# Patient Record
Sex: MALE | Race: BLACK OR AFRICAN AMERICAN
[De-identification: names, ages, dates, MRNs, and addresses within clinical notes are randomized per-mention and may not be internally consistent; named-entity substitution may affect disease eponyms.]

---

## 2019-10-13 ENCOUNTER — HOSPITAL ENCOUNTER (EMERGENCY)
Dept: HOSPITAL 72 - EMR | Age: 64
Discharge: HOME | End: 2019-10-13
Payer: COMMERCIAL

## 2019-10-13 VITALS — HEIGHT: 69 IN | WEIGHT: 130 LBS | BODY MASS INDEX: 19.26 KG/M2

## 2019-10-13 VITALS — SYSTOLIC BLOOD PRESSURE: 95 MMHG | DIASTOLIC BLOOD PRESSURE: 61 MMHG

## 2019-10-13 VITALS — SYSTOLIC BLOOD PRESSURE: 113 MMHG | DIASTOLIC BLOOD PRESSURE: 70 MMHG

## 2019-10-13 VITALS — DIASTOLIC BLOOD PRESSURE: 76 MMHG | SYSTOLIC BLOOD PRESSURE: 118 MMHG

## 2019-10-13 DIAGNOSIS — S01.01XA: ICD-10-CM
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
## 2019-10-13 DIAGNOSIS — S09.90XA: Primary | ICD-10-CM

## 2019-10-13 DIAGNOSIS — E11.9: ICD-10-CM

## 2019-10-13 LAB
ADD MANUAL DIFF: NO
ALBUMIN SERPL-MCNC: 3.4 G/DL (ref 3.4–5)
ALBUMIN/GLOB SERPL: 1 {RATIO} (ref 1–2.7)
ALP SERPL-CCNC: 76 U/L (ref 46–116)
ALT SERPL-CCNC: 25 U/L (ref 12–78)
ANION GAP SERPL CALC-SCNC: 9 MMOL/L (ref 5–15)
AST SERPL-CCNC: 17 U/L (ref 15–37)
BASOPHILS NFR BLD AUTO: 0.7 % (ref 0–2)
BILIRUB SERPL-MCNC: 0.1 MG/DL (ref 0.2–1)
BUN SERPL-MCNC: 24 MG/DL (ref 7–18)
CALCIUM SERPL-MCNC: 9.3 MG/DL (ref 8.5–10.1)
CHLORIDE SERPL-SCNC: 111 MMOL/L (ref 98–107)
CO2 SERPL-SCNC: 22 MMOL/L (ref 21–32)
CREAT SERPL-MCNC: 1.4 MG/DL (ref 0.55–1.3)
EOSINOPHIL NFR BLD AUTO: 0.7 % (ref 0–3)
ERYTHROCYTE [DISTWIDTH] IN BLOOD BY AUTOMATED COUNT: 13.5 % (ref 11.6–14.8)
GLOBULIN SER-MCNC: 3.5 G/DL
HCT VFR BLD CALC: 32.9 % (ref 42–52)
HGB BLD-MCNC: 9.9 G/DL (ref 14.2–18)
LYMPHOCYTES NFR BLD AUTO: 24.6 % (ref 20–45)
MCV RBC AUTO: 80 FL (ref 80–99)
MONOCYTES NFR BLD AUTO: 5.2 % (ref 1–10)
NEUTROPHILS NFR BLD AUTO: 68.8 % (ref 45–75)
PLATELET # BLD: 224 K/UL (ref 150–450)
POTASSIUM SERPL-SCNC: 4.4 MMOL/L (ref 3.5–5.1)
RBC # BLD AUTO: 4.11 M/UL (ref 4.7–6.1)
SODIUM SERPL-SCNC: 142 MMOL/L (ref 136–145)
WBC # BLD AUTO: 5.5 K/UL (ref 4.8–10.8)

## 2019-10-13 PROCEDURE — 85025 COMPLETE CBC W/AUTO DIFF WBC: CPT

## 2019-10-13 PROCEDURE — 96360 HYDRATION IV INFUSION INIT: CPT

## 2019-10-13 PROCEDURE — 12002 RPR S/N/AX/GEN/TRNK2.6-7.5CM: CPT

## 2019-10-13 PROCEDURE — 80053 COMPREHEN METABOLIC PANEL: CPT

## 2019-10-13 PROCEDURE — 36415 COLL VENOUS BLD VENIPUNCTURE: CPT

## 2019-10-13 PROCEDURE — 99284 EMERGENCY DEPT VISIT MOD MDM: CPT

## 2019-10-13 PROCEDURE — 70450 CT HEAD/BRAIN W/O DYE: CPT

## 2019-10-13 NOTE — DIAGNOSTIC IMAGING REPORT
EXAM:

  CT Head Without Intravenous Contrast

 

CLINICAL HISTORY:

  AMS

 

TECHNIQUE:

  Axial computed tomography images of the head brain without intravenous 

contrast.  CTDI is 74 mGy and DLP is 1757 mGy-cm.  One or more of the 

following dose reduction techniques were used: automated exposure control,

 adjustment of the mA and or kV according to patient size, use of 

iterative reconstruction technique.

 

COMPARISON:

  No relevant prior studies available.

 

FINDINGS:

  Brain:  Unremarkable.  No hemorrhage.  No significant white matter 

disease.  No edema.

  Ventricles:  Unremarkable.  No ventriculomegaly.

  Bones joints:  Unremarkable.  No acute fracture.

  Soft tissues:  Unremarkable.

  Sinuses:  Unremarkable as visualized.  No acute sinusitis.

  Mastoid air cells:  Unremarkable as visualized.  No mastoid effusion.

 

IMPRESSION:     

No evidence of acute intracranial abnormality.

## 2019-10-13 NOTE — NUR
ED Nurse Note:



Patient was BIB his daughter due to fall. Per daugter she found him in the 
bathroom. Patient states that had 2 bottle of vodka today, Presented with 
strong smell of alcohol. AAO x1, VSS at thios time. Patient presented with 
lacerations on back of his head.

## 2019-10-13 NOTE — NUR
ED Nurse Note:



Pt is more awake and alert at this time. More water to drink provided. Dr Hardin notified.

## 2019-10-13 NOTE — NUR
ED Nurse Note:



Pt ambulated with steady gait at this time. AAO x4 and follows commands. 
Notified Dr wiggins.

## 2019-10-13 NOTE — NUR
ED Nurse Note:



Recieved report from barbara hughes to resume care, pt in bed resting quietly, awake 
and alert, on cardiac monitopring, has patent IV site in right arm with fluuids 
infusing, pt does not have urine at this time, will resume care as ordered and 
continue to closely monitor and prepare for discharge when pt is sober and safe 
to be discharged.

## 2019-10-13 NOTE — NUR
ER DISCHARGE NOTE:



Patient is cleared to be discharged per ERMD, pt is aox4, on room air, with 
stable vital signs. pt was given dc instructions, pt was able to verbalize 
understanding, pt id band and iv site removed without complications. pt is able 
to ambulate with steady gait. pt took all belongings.

## 2019-10-13 NOTE — NUR
ED Nurse Note:



Pt sleeping on bed with no distress. IVF fluids running. On cardiac monitor 
with fall precautions implemented: side rails up with bed locked in lowest 
position.

## 2019-10-13 NOTE — EMERGENCY ROOM REPORT
History of Present Illness


General


Chief Complaint:  Multiple Trauma/Fall


Source:  Patient


 (Leoncio Leone MD)





Present Illness


HPI


63-year-old male presents ED for evaluation.  Brought in by EMS.  Status post 

fall in bathroom tonight.  Head injury.  Daughter called 911.  Fall was 

unwitnessed.  EMS states that they were multiple beer bottles next to the 

patient.  Daughter states patient has history of alcohol abuse.  Per EMS 

patient was answering questions appropriately.  Tetanus unknown.  Patient 

denies any pain.  Denies any other injuries.  No other aggravating relieving 

factors.  Denies any other associated symptoms


 (Leoncio Leone MD)


Allergies:  


Coded Allergies:  


     No Known Allergies (Unverified , 10/13/19)





Patient History


Past Medical History:  DM


Past Surgical History:  none


Pertinent Family History:  none


Social History:  Reports: alcohol use; Denies: smoking, drug use


Immunizations:  UTD


Reviewed Nursing Documentation:  PMH: Agreed; PSxH: Agreed (Leoncio Leone MD)





Nursing Documentation-PMH


Hx Diabetes:  Yes


 (Leoncio Leone MD)





Review of Systems


All Other Systems:  negative except mentioned in HPI


 (Leoncio Leone MD)





Physical Exam





Vital Signs








  Date Time  Temp Pulse Resp B/P (MAP) Pulse Ox O2 Delivery O2 Flow Rate FiO2


 


10/13/19 04:16 98.4 83 15 95/61 (72) 100 Room Air  








Sp02 EP Interpretation:  reviewed, normal


General Appearance:  no apparent distress, GCS 15, non-toxic, other - 

intoxicated


Head:  normocephalic, other - 5cm posterior scalp laceration


Eyes:  bilateral eye normal inspection, bilateral eye PERRL


ENT:  hearing grossly normal, normal pharynx, no angioedema, normal voice


Neck:  full range of motion, supple/symm/no masses


Respiratory:  chest non-tender, lungs clear, normal breath sounds, speaking 

full sentences


Cardiovascular #1:  regular rate, rhythm, no edema


Cardiovascular #2:  2+ carotid (R), 2+ carotid (L), 2+ radial (R), 2+ radial (L)

, 2+ dorsalis pedis (R), 2+ dorsalis pedis (L)


Gastrointestinal:  normal bowel sounds, non tender, soft, non-distended, no 

guarding, no rebound


Rectal:  deferred


Genitourinary:  normal inspection, no CVA tenderness


Musculoskeletal:  back normal, gait/station normal, normal range of motion, non-

tender


Neurologic:  other - intoxicated


Psychiatric:  other - intoxicated


Reflexes:  3+ bicep (R), 3+ bicep (L), 3+ tricep (R), 3+ tricep (L), 3+ knee (R)

, 3+ knee (L)


Skin:  no rash


Lymphatic:  no adenopathy


 (Leoncio Leone MD)





Medical Decision Making





Last Vital Signs








  Date Time  Temp Pulse Resp B/P (MAP) Pulse Ox O2 Delivery O2 Flow Rate FiO2


 


10/13/19 04:16 98.4 83 15 95/61 (72) 100 Room Air  








 (Leoncio Leone MD)


Reevaluation Time:  10:06


Reevaluation Impression


Assumed care of the patient approximately 7 AM from Dr. Leone.  Briefly, 

this is a 63-year-old male who had a fall while intoxicated sustaining several 

lacerations to the scalp which were closed with sutures.  Labs and CT were 

unremarkable.  The patient was allowed to metabolize in the emergency 

department and is now clinically sober.  He states he had a fall from standing 

and this was not attempted self-harm and has no self-harm ideation.  He states 

this was simply a accident and will follow up with his PMD at [place] as soon as 

possible.  Patient is stable and appropriate for outpatient follow-up.  We 

discussed reasons to return to the emergency department.  He understands and 

agrees with treatment plan was discharged


 (Javi Hardin MD)


Disposition:  HOME, SELF-CARE


Condition:  Improved


Scripts


Unable to Obtain Active Prescriptions or Reported Meds











Leoncio Leone MD Oct 13, 2019 04:54


Javi Hardin MD Oct 13, 2019 10:07

## 2020-04-11 ENCOUNTER — HOSPITAL ENCOUNTER (EMERGENCY)
Dept: HOSPITAL 72 - EMR | Age: 65
Discharge: HOME | End: 2020-04-11
Payer: COMMERCIAL

## 2020-04-11 VITALS — DIASTOLIC BLOOD PRESSURE: 101 MMHG | SYSTOLIC BLOOD PRESSURE: 172 MMHG

## 2020-04-11 VITALS — SYSTOLIC BLOOD PRESSURE: 95 MMHG | DIASTOLIC BLOOD PRESSURE: 56 MMHG

## 2020-04-11 VITALS — HEIGHT: 67 IN | WEIGHT: 140 LBS | BODY MASS INDEX: 21.97 KG/M2

## 2020-04-11 VITALS — DIASTOLIC BLOOD PRESSURE: 71 MMHG | SYSTOLIC BLOOD PRESSURE: 102 MMHG

## 2020-04-11 VITALS — SYSTOLIC BLOOD PRESSURE: 102 MMHG | DIASTOLIC BLOOD PRESSURE: 71 MMHG

## 2020-04-11 DIAGNOSIS — K52.9: ICD-10-CM

## 2020-04-11 DIAGNOSIS — R19.7: ICD-10-CM

## 2020-04-11 DIAGNOSIS — E11.649: Primary | ICD-10-CM

## 2020-04-11 DIAGNOSIS — D64.9: ICD-10-CM

## 2020-04-11 LAB
ADD MANUAL DIFF: YES
ALBUMIN SERPL-MCNC: 2.9 G/DL (ref 3.4–5)
ALBUMIN/GLOB SERPL: 0.6 {RATIO} (ref 1–2.7)
ALP SERPL-CCNC: 118 U/L (ref 46–116)
ALT SERPL-CCNC: 25 U/L (ref 12–78)
ANION GAP SERPL CALC-SCNC: 14 MMOL/L (ref 5–15)
APPEARANCE UR: CLEAR
APTT PPP: (no result) S
AST SERPL-CCNC: 35 U/L (ref 15–37)
BILIRUB SERPL-MCNC: 0.5 MG/DL (ref 0.2–1)
BUN SERPL-MCNC: 28 MG/DL (ref 7–18)
CALCIUM SERPL-MCNC: 10.2 MG/DL (ref 8.5–10.1)
CHLORIDE SERPL-SCNC: 102 MMOL/L (ref 98–107)
CO2 SERPL-SCNC: 18 MMOL/L (ref 21–32)
CREAT SERPL-MCNC: 1.8 MG/DL (ref 0.55–1.3)
ERYTHROCYTE [DISTWIDTH] IN BLOOD BY AUTOMATED COUNT: 11.9 % (ref 11.6–14.8)
GLOBULIN SER-MCNC: 5 G/DL
GLUCOSE UR STRIP-MCNC: NEGATIVE MG/DL
HCT VFR BLD CALC: 26 % (ref 42–52)
HGB BLD-MCNC: 7.9 G/DL (ref 14.2–18)
KETONES UR QL STRIP: NEGATIVE
LEUKOCYTE ESTERASE UR QL STRIP: NEGATIVE
MCV RBC AUTO: 76 FL (ref 80–99)
NITRITE UR QL STRIP: NEGATIVE
PH UR STRIP: 5 [PH] (ref 4.5–8)
PLATELET # BLD: 300 K/UL (ref 150–450)
POTASSIUM SERPL-SCNC: 5.2 MMOL/L (ref 3.5–5.1)
PROT UR QL STRIP: (no result)
RBC # BLD AUTO: 3.42 M/UL (ref 4.7–6.1)
SODIUM SERPL-SCNC: 134 MMOL/L (ref 136–145)
SP GR UR STRIP: 1.01 (ref 1–1.03)
UROBILINOGEN UR-MCNC: NORMAL MG/DL (ref 0–1)
WBC # BLD AUTO: 12.5 K/UL (ref 4.8–10.8)

## 2020-04-11 PROCEDURE — 99291 CRITICAL CARE FIRST HOUR: CPT

## 2020-04-11 PROCEDURE — 96367 TX/PROPH/DG ADDL SEQ IV INF: CPT

## 2020-04-11 PROCEDURE — 96375 TX/PRO/DX INJ NEW DRUG ADDON: CPT

## 2020-04-11 PROCEDURE — 85025 COMPLETE CBC W/AUTO DIFF WBC: CPT

## 2020-04-11 PROCEDURE — 96366 THER/PROPH/DIAG IV INF ADDON: CPT

## 2020-04-11 PROCEDURE — 85007 BL SMEAR W/DIFF WBC COUNT: CPT

## 2020-04-11 PROCEDURE — 36415 COLL VENOUS BLD VENIPUNCTURE: CPT

## 2020-04-11 PROCEDURE — 80053 COMPREHEN METABOLIC PANEL: CPT

## 2020-04-11 PROCEDURE — 81003 URINALYSIS AUTO W/O SCOPE: CPT

## 2020-04-11 PROCEDURE — 96365 THER/PROPH/DIAG IV INF INIT: CPT

## 2020-04-11 PROCEDURE — 83690 ASSAY OF LIPASE: CPT

## 2020-04-11 PROCEDURE — 71045 X-RAY EXAM CHEST 1 VIEW: CPT

## 2020-04-11 NOTE — EMERGENCY ROOM REPORT
History of Present Illness


General


Chief Complaint:  Abnormal Labs


Source:  Patient, EMS





Present Illness


HPI


64-year-old male presents the ED for evaluation of hypoglycemia.  Accu-Chek per 

EMS today was in the 30s.  Given glucose.  States he is a diabetic and takes 

insulin.  Patient also noted to be hypotensive.  Systolic in the 90s.  Given IV 

fluids per EMS.  BP improved on arrival.  Patient states he has been having 

diarrhea for the last few days.  Denies fevers or chills.  Denies cough or 

congestion.  Denies abdominal pain.  Denies sick contacts or recent travel.  No 

other aggravating relieving factors.  Denies any other associated symptoms


Allergies:  


Coded Allergies:  


     No Known Allergies (Unverified , 10/13/19)





COVID-19 Screening


Contact w/high risk pt:  No


Recent Travel to affected area:  No


Experienced COVID-19 symptoms?:  No





Patient History


Past Medical History:  DM


Past Surgical History:  none


Pertinent Family History:  none


Social History:  Denies: smoking, alcohol use, drug use


Immunizations:  UTD


Reviewed Nursing Documentation:  PMH: Agreed; PSxH: Agreed





Nursing Documentation-PMH


Past Medical History:  No Stated History


Hx Diabetes:  Yes





Review of Systems


All Other Systems:  negative except mentioned in HPI





Physical Exam





Vital Signs








  Date Time  Temp Pulse Resp B/P (MAP) Pulse Ox O2 Delivery O2 Flow Rate FiO2


 


4/11/20 10:57  89 20 95/56 (69) 100 Room Air  








Sp02 EP Interpretation:  reviewed, normal


General Appearance:  no apparent distress, alert, GCS 15, non-toxic


Head:  normocephalic, atraumatic


Eyes:  bilateral eye normal inspection, bilateral eye PERRL


ENT:  hearing grossly normal, normal pharynx, no angioedema, normal voice


Neck:  full range of motion, supple/symm/no masses


Respiratory:  chest non-tender, lungs clear, normal breath sounds, speaking 

full sentences


Cardiovascular #1:  regular rate, rhythm, no edema


Cardiovascular #2:  2+ carotid (R), 2+ carotid (L), 2+ radial (R), 2+ radial (L)

, 2+ dorsalis pedis (R), 2+ dorsalis pedis (L)


Gastrointestinal:  normal bowel sounds, non tender, soft, non-distended, no 

guarding, no rebound


Rectal:  deferred


Genitourinary:  normal inspection, no CVA tenderness


Musculoskeletal:  back normal, normal range of motion, gait/station normal, non-

tender


Neurologic:  alert, motor strength/tone normal, oriented x3, sensory intact, 

responsive, speech normal


Psychiatric:  judgement/insight normal, memory normal, mood/affect normal, no 

suicidal/homicidal ideation


Reflexes:  3+ bicep (R), 3+ bicep (L), 3+ tricep (R), 3+ tricep (L), 3+ knee (R)

, 3+ knee (L)


Lymphatic:  no adenopathy





Procedures


Critical Care Time


Critical Care Time


i.  I feel this is a highly complex case requiring extensive working including 

EKG/Rhythm strip, Xray/CT/US, Blood/urine lab work, repeat exams while in ED, 


and administration of strong opiates/narcotics for pain control, admission to 

hospital or close patient follow up.  





Total time: 45 min bedside evaluation and treatment excludes procedures (EKG). 


Reason for critical care: hypoglycemia, hypotension


Possible complications: hypotension, hypertension, MI, shock, arrhythmias, 

metabolic acidosis, end organ damage, respiratory failure. 


Interventions: labs, IVFs, guiac, D50, D5W, Cipro


Course: Patient presenting with hypoglycemia, vomiting and diarrhea.  Accu-Chek 

in the 40s.  Given D50.  Started on D5W.  Creatinine elevated.  Hemoglobin 7.9.

  Guaiac negative.  Given antibiotics.  BP improved after IV fluids.


Consultations: nursing staff, EMS, family 


Performed by: Dr Leone


Tolerated well condition = serious





j.  because of unstable vital signs this patient had a condition that could 

potentially threaten life or limb.  I feel this is a critical patient who 

required my full attention while patient was considered critical.  Total 

Critical Care Time excluding procedures was greater than 45 minutes





Medical Decision Making


Diagnostic Impression:  


 Primary Impression:  


 Hypoglycemia


 Additional Impressions:  


 Colitis


 Anemia


 Qualified Codes:  D64.9 - Anemia, unspecified


ER Course


Hospital Course 


64-year-old female presenting to ED with generalized weakness, diarrhea, low FS 

in field





Differential diagnoses include: dehyration, sepsis, hypoglycemia





Clinical course


Patient placed on stretcher.  On cardiac monitor.  Accu-Chek in the 40s.  After 

initial history and physical I ordered labs, CXR, D50, and D5w





Labs-glucose 47, Cr 1.8, WBC 12.5, Hb 7.9


CXR - no acute process, no ground glass opacities


Guiac negative.  BP improved after IV fluids.





Started on D5W.  Given antibiotics.





because of insurance patient will be transferred to [facility].  I feel this is a highly complex case requiring extensive working including 

EKG/Rhythm strip, Xray/CT/US, Blood/urine lab work, repeat exams while in ED, 

and administration of strong opiates/narcotics for pain control, admission to 

hospital or close patient follow up.  





diagnosis - hypoglycemia, colitis, anemia





transferred in serious condition





Labs








Test


  4/11/20


11:20 4/11/20


11:40


 


White Blood Count


  12.5 K/UL


(4.8-10.8) 


 


 


Red Blood Count


  3.42 M/UL


(4.70-6.10) 


 


 


Hemoglobin


  7.9 G/DL


(14.2-18.0) 


 


 


Hematocrit


  26.0 %


(42.0-52.0) 


 


 


Mean Corpuscular Volume 76 FL (80-99)  


 


Mean Corpuscular Hemoglobin


  23.2 PG


(27.0-31.0) 


 


 


Mean Corpuscular Hemoglobin


Concent 30.6 G/DL


(32.0-36.0) 


 


 


Red Cell Distribution Width


  11.9 %


(11.6-14.8) 


 


 


Platelet Count


  300 K/UL


(150-450) 


 


 


Mean Platelet Volume


  6.9 FL


(6.5-10.1) 


 


 


Neutrophils (%) (Auto)  % (45.0-75.0)  


 


Lymphocytes (%) (Auto)  % (20.0-45.0)  


 


Monocytes (%) (Auto)  % (1.0-10.0)  


 


Eosinophils (%) (Auto)  % (0.0-3.0)  


 


Basophils (%) (Auto)  % (0.0-2.0)  


 


Differential Total Cells


Counted 100 


  


 


 


Neutrophils % (Manual) 85 % (45-75)  


 


Lymphocytes % (Manual) 13 % (20-45)  


 


Monocytes % (Manual) 2 % (1-10)  


 


Eosinophils % (Manual) 0 % (0-3)  


 


Basophils % (Manual) 0 % (0-2)  


 


Band Neutrophils 0 % (0-8)  


 


Platelet Estimate Adequate  


 


Platelet Morphology Normal  


 


Hypochromasia 3+  


 


Anisocytosis 1+  


 


Microcytosis 1+  


 


Sodium Level


  134 MMOL/L


(136-145) 


 


 


Potassium Level


  5.2 MMOL/L


(3.5-5.1) 


 


 


Chloride Level


  102 MMOL/L


() 


 


 


Carbon Dioxide Level


  18 MMOL/L


(21-32) 


 


 


Anion Gap


  14 mmol/L


(5-15) 


 


 


Blood Urea Nitrogen


  28 mg/dL


(7-18) 


 


 


Creatinine


  1.8 MG/DL


(0.55-1.30) 


 


 


Estimat Glomerular Filtration


Rate 46.2 mL/min


(>60) 


 


 


Glucose Level


  47 MG/DL


() 


 


 


Calcium Level


  10.2 MG/DL


(8.5-10.1) 


 


 


Total Bilirubin


  0.5 MG/DL


(0.2-1.0) 


 


 


Aspartate Amino Transf


(AST/SGOT) 35 U/L (15-37) 


  


 


 


Alanine Aminotransferase


(ALT/SGPT) 25 U/L (12-78) 


  


 


 


Alkaline Phosphatase


  118 U/L


() 


 


 


Total Protein


  7.9 G/DL


(6.4-8.2) 


 


 


Albumin


  2.9 G/DL


(3.4-5.0) 


 


 


Globulin 5.0 g/dL  


 


Albumin/Globulin Ratio 0.6 (1.0-2.7)  


 


Lipase


  181 U/L


() 


 


 


Urine Color  Pale yellow 


 


Urine Appearance  Clear 


 


Urine pH  5 (4.5-8.0) 


 


Urine Specific Gravity


  


  1.015


(1.005-1.035)


 


Urine Protein  2+ (NEGATIVE) 


 


Urine Glucose (UA)


  


  Negative


(NEGATIVE)


 


Urine Ketones


  


  Negative


(NEGATIVE)


 


Urine Blood  1+ (NEGATIVE) 


 


Urine Nitrite


  


  Negative


(NEGATIVE)


 


Urine Bilirubin


  


  Negative


(NEGATIVE)


 


Urine Urobilinogen


  


  Normal MG/DL


(0.0-1.0)


 


Urine Leukocyte Esterase


  


  Negative


(NEGATIVE)


 


Urine RBC


  


  2-4 /HPF (0 -


0)


 


Urine WBC


  


  0-2 /HPF (0 -


0)


 


Urine Squamous Epithelial


Cells 


  Few /LPF


(NONE/OCC)


 


Urine Bacteria


  


  Occasional


/HPF (NONE)








Chest X-Ray Diagnostic Results


Chest X-Ray Diagnostic Results :  


   Chest X-Ray Ordered:  Yes


   # of Views/Limited/Complete:  1 View


   Indication:  Other


   EP Interpretation:  Yes


   Interpretation:  no consolidation, no effusion, no pneumothorax, no acute 

cardiopulmonary disease


   Impression:  No acute disease


   Electronically Signed by:  Electronically signed by Leoncio Leone MD





Last Vital Signs








  Date Time  Temp Pulse Resp B/P (MAP) Pulse Ox O2 Delivery O2 Flow Rate FiO2


 


4/11/20 11:35   20 95/56 100 Room Air  


 


4/11/20 10:57  89      








Status:  improved


Disposition:  HOME, SELF-CARE


Condition:  Stable











Leoncio Leone MD Apr 11, 2020 11:48

## 2020-04-11 NOTE — NUR
ED Nurse Note:



Pt is on bed, awake and alert; VSS, on RA. Pt denies any pain nor discomfort 
right now. Kept lights off as requested. Will continue to monitor.

## 2020-04-11 NOTE — DIAGNOSTIC IMAGING REPORT
EXAM:

  XR Chest, 1 View

 

CLINICAL HISTORY:

  ABD PAIN

 

TECHNIQUE:

  Frontal view of the chest.

 

COMPARISON:

  No relevant prior studies available.

 

FINDINGS:

  Lungs:  Unremarkable.  No consolidation.

  Pleural space:  Unremarkable.  No pneumothorax.

  Heart:  Unremarkable.  No cardiomegaly.

  Mediastinum: Calcified aorta.

  Bones/joints: Chronic right distal clavicle deformity.  Degeneration of 

both acromioclavicular joints.

 

IMPRESSION:     

No evidence of acute pulmonary disease

## 2020-04-11 NOTE — NUR
ED Nurse Note:



Pt was brought in by ambulance d/t hypotension and hypoglycemia. Pt is AOx4, 
calm and cooperative noted with contraction on upper bilateral extremities and 
generalized body weakness. Per pt, he's also having some diarrhea going for 'a 
long time' already. Placed on bed and gown; hooked to cardiac monitor. VSS, on 
RA, afebrile on triage. Pt arrived with a patent IV established by EMS on RT AC 
with 18G. Safety measures met; offered warm blankets, will continue to monitor.

## 2020-04-11 NOTE — NUR
ED Nurse Note:



Pt was transferred to [facility] for continuity of care. Pt was picked up 
by [facility], report for hand-off given to DORCAS Rodriguez in [place]. All belongings 
was sent to pt. Family was aware of transfer. Pt left ED on stable condition.